# Patient Record
Sex: MALE | Race: WHITE | NOT HISPANIC OR LATINO | Employment: UNEMPLOYED | URBAN - NONMETROPOLITAN AREA
[De-identification: names, ages, dates, MRNs, and addresses within clinical notes are randomized per-mention and may not be internally consistent; named-entity substitution may affect disease eponyms.]

---

## 2017-01-05 ENCOUNTER — HOSPITAL ENCOUNTER (OUTPATIENT)
Dept: URGENT CARE | Facility: CLINIC | Age: 37
Discharge: HOME | End: 2017-01-05
Attending: FAMILY MEDICINE | Admitting: FAMILY MEDICINE

## 2023-03-17 ENCOUNTER — HOSPITAL ENCOUNTER (EMERGENCY)
Facility: HOSPITAL | Age: 43
Discharge: HOME | End: 2023-03-18
Attending: FAMILY MEDICINE | Admitting: FAMILY MEDICINE
Payer: MEDICAID

## 2023-03-17 DIAGNOSIS — S82.001A CLOSED NONDISPLACED FRACTURE OF RIGHT PATELLA, UNSPECIFIED FRACTURE MORPHOLOGY, INITIAL ENCOUNTER: ICD-10-CM

## 2023-03-17 DIAGNOSIS — S82.65XA NONDISPLACED FRACTURE OF LATERAL MALLEOLUS OF LEFT FIBULA, INITIAL ENCOUNTER FOR CLOSED FRACTURE: Primary | ICD-10-CM

## 2023-03-17 DIAGNOSIS — S63.502A SPRAIN OF LEFT WRIST, INITIAL ENCOUNTER: ICD-10-CM
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
## 2023-03-17 PROCEDURE — 99284 EMERGENCY DEPT VISIT MOD MDM: CPT

## 2023-03-17 PROCEDURE — 73110 X-RAY EXAM OF WRIST: CPT

## 2023-03-17 PROCEDURE — 96372 THER/PROPH/DIAG INJ SC/IM: CPT

## 2023-03-18 ENCOUNTER — APPOINTMENT (OUTPATIENT)
Dept: GENERAL RADIOLOGY | Facility: HOSPITAL | Age: 43
End: 2023-03-18
Payer: MEDICAID

## 2023-03-18 VITALS
TEMPERATURE: 98 F | HEART RATE: 96 BPM | OXYGEN SATURATION: 100 % | HEIGHT: 70 IN | SYSTOLIC BLOOD PRESSURE: 114 MMHG | WEIGHT: 135 LBS | DIASTOLIC BLOOD PRESSURE: 80 MMHG | BODY MASS INDEX: 19.33 KG/M2 | RESPIRATION RATE: 16 BRPM

## 2023-03-18 PROCEDURE — 73110 X-RAY EXAM OF WRIST: CPT

## 2023-03-18 PROCEDURE — 73564 X-RAY EXAM KNEE 4 OR MORE: CPT

## 2023-03-18 PROCEDURE — 73090 X-RAY EXAM OF FOREARM: CPT

## 2023-03-18 PROCEDURE — 73610 X-RAY EXAM OF ANKLE: CPT

## 2023-03-18 PROCEDURE — 25010000002 KETOROLAC TROMETHAMINE PER 15 MG: Performed by: FAMILY MEDICINE

## 2023-03-18 PROCEDURE — 73130 X-RAY EXAM OF HAND: CPT

## 2023-03-18 RX ORDER — KETOROLAC TROMETHAMINE 30 MG/ML
30 INJECTION, SOLUTION INTRAMUSCULAR; INTRAVENOUS ONCE
Status: COMPLETED | OUTPATIENT
Start: 2023-03-18 | End: 2023-03-18

## 2023-03-18 RX ORDER — HYDROCODONE BITARTRATE AND ACETAMINOPHEN 5; 325 MG/1; MG/1
1 TABLET ORAL EVERY 6 HOURS PRN
Qty: 15 TABLET | Refills: 0 | Status: SHIPPED | OUTPATIENT
Start: 2023-03-18 | End: 2023-03-23 | Stop reason: SDUPTHER

## 2023-03-18 RX ORDER — HYDROCODONE BITARTRATE AND ACETAMINOPHEN 5; 325 MG/1; MG/1
1 TABLET ORAL ONCE
Status: COMPLETED | OUTPATIENT
Start: 2023-03-18 | End: 2023-03-18

## 2023-03-18 RX ORDER — HYDROCODONE BITARTRATE AND ACETAMINOPHEN 5; 325 MG/1; MG/1
1 TABLET ORAL EVERY 6 HOURS PRN
Qty: 15 TABLET | Refills: 0 | Status: SHIPPED | OUTPATIENT
Start: 2023-03-18 | End: 2023-03-18 | Stop reason: SDUPTHER

## 2023-03-18 RX ADMIN — HYDROCODONE BITARTRATE AND ACETAMINOPHEN 1 TABLET: 5; 325 TABLET ORAL at 02:15

## 2023-03-18 RX ADMIN — KETOROLAC TROMETHAMINE 30 MG: 30 INJECTION, SOLUTION INTRAMUSCULAR; INTRAVENOUS at 02:15

## 2023-03-18 NOTE — ED PROVIDER NOTES
Subjective   History of Present Illness  Patient is a 42 years old who present here today because he fell home while going down the stairs.  Complained having some pain in the left ankle, right knee, left wrist, left forearm and left hand.  Denies any change in mental status.    History provided by:  Patient   used: No        Review of Systems   Musculoskeletal: Positive for arthralgias and joint swelling.   All other systems reviewed and are negative.      History reviewed. No pertinent past medical history.    No Known Allergies    History reviewed. No pertinent surgical history.    History reviewed. No pertinent family history.    Social History     Socioeconomic History   • Marital status: Single   Tobacco Use   • Smoking status: Every Day   • Smokeless tobacco: Never           Objective   Physical Exam  Vitals and nursing note reviewed.   Constitutional:       Appearance: Normal appearance. He is normal weight.   HENT:      Head: Normocephalic and atraumatic.      Right Ear: Tympanic membrane, ear canal and external ear normal.      Left Ear: Tympanic membrane, ear canal and external ear normal.      Nose: Nose normal.   Eyes:      Extraocular Movements: Extraocular movements intact.      Conjunctiva/sclera: Conjunctivae normal.      Pupils: Pupils are equal, round, and reactive to light.   Cardiovascular:      Rate and Rhythm: Normal rate and regular rhythm.      Pulses: Normal pulses.      Heart sounds: Normal heart sounds.   Pulmonary:      Effort: Pulmonary effort is normal.      Breath sounds: Normal breath sounds.   Abdominal:      General: Abdomen is flat. Bowel sounds are normal.      Palpations: Abdomen is soft.   Musculoskeletal:         General: Swelling, tenderness and signs of injury present. Normal range of motion.      Cervical back: Normal range of motion and neck supple.      Right knee: Swelling and bony tenderness present. Tenderness present.        Legs:       Comments:  Swelling and tender   Skin:     Capillary Refill: Capillary refill takes less than 2 seconds.      Findings: Bruising present.   Neurological:      General: No focal deficit present.      Mental Status: He is alert and oriented to person, place, and time.         Procedures           ED Course            Labs Reviewed - No data to display    XR Knee 4+ View Right   Final Result   1.  Fracture of the left medial malleolus and possibly also the   posterior malleolus.   2.  Fracture of the right patella with a moderate right knee   effusion      Electronically signed by:  Raghu Villagran MD  3/18/2023 2:41 AM CDT   Workstation: [id number]      XR Wrist 3+ View Right   Final Result   1.  Fracture of the left medial malleolus and possibly also the   posterior malleolus.   2.  Fracture of the right patella with a moderate right knee   effusion      Electronically signed by:  Raghu Villagran MD  3/18/2023 2:41 AM CDT   Workstation: [id number]      XR Ankle 3+ View Left   Final Result   1.  Fracture of the left medial malleolus and possibly also the   posterior malleolus.   2.  Fracture of the right patella with a moderate right knee   effusion      Electronically signed by:  Raghu Villagran MD  3/18/2023 2:41 AM CDT   Workstation: [id number]      XR Forearm 2 View Left   Final Result   1.  Fracture of the left medial malleolus and possibly also the   posterior malleolus.   2.  Fracture of the right patella with a moderate right knee   effusion      Electronically signed by:  Raghu Villagran MD  3/18/2023 2:41 AM CDT   Workstation: [id number]      XR Wrist 3+ View Left   Final Result   1.  Fracture of the left medial malleolus and possibly also the   posterior malleolus.   2.  Fracture of the right patella with a moderate right knee   effusion      Electronically signed by:  Raghu Villagran MD  3/18/2023 2:41 AM CDT   Workstation: [id number]      XR Hand 3+ View Left   Final Result   1.  Fracture of the left medial malleolus and possibly also the   posterior  malleolus.   2.  Fracture of the right patella with a moderate right knee   effusion      Electronically signed by:  Raghu Villagran MD  3/18/2023 2:41 AM CDT   Workstation: [id number]                                        Medical Decision Making  Patient is a 42 years old who fell from the stairs have a right patella fracture, left distal fibula fracture.  Patient was discharged home to follow-up with .  Patient was put on the boot and for the ankle and then right knee splint.  Patient was given crutches.    Closed nondisplaced fracture of right patella, unspecified fracture morphology, initial encounter: acute illness or injury  Fall, initial encounter: acute illness or injury  Nondisplaced fracture of lateral malleolus of left fibula, initial encounter for closed fracture: acute illness or injury  Sprain of left wrist, initial encounter: acute illness or injury  Amount and/or Complexity of Data Reviewed  Radiology: ordered.      Risk  Prescription drug management.          Final diagnoses:   Nondisplaced fracture of lateral malleolus of left fibula, initial encounter for closed fracture   Fall, initial encounter   Sprain of left wrist, initial encounter   Closed nondisplaced fracture of right patella, unspecified fracture morphology, initial encounter       ED Disposition  ED Disposition     ED Disposition   Discharge    Condition   Stable    Comment   --             No follow-up provider specified.       Medication List      No changes were made to your prescriptions during this visit.          Kris Rosales MD  03/18/23 0304       Kris Rosales MD  03/18/23 0309

## 2023-03-18 NOTE — DISCHARGE INSTRUCTIONS
Patient was told not to apply weight to the ankle and also to the knee as much as possible.  Take pain medication as directed.  Follow-up with Dr. Kang in 2 days.  Come back to the ER symptoms get worse.

## 2023-03-18 NOTE — ED NOTES
"Patient called the  repeatedly demanding pain medication. States \"I'm not doing anything until someone comes in here and gives me some damn pain meds!\" This RN explained to patient that I could not administer medication without a doctors order and that he was next to be picked up. This RN apologized about the delay.   "

## 2023-03-22 DIAGNOSIS — M25.572 ACUTE LEFT ANKLE PAIN: ICD-10-CM

## 2023-03-22 DIAGNOSIS — M25.561 ACUTE PAIN OF RIGHT KNEE: Primary | ICD-10-CM

## 2023-03-23 ENCOUNTER — OFFICE VISIT (OUTPATIENT)
Dept: ORTHOPEDIC SURGERY | Facility: CLINIC | Age: 43
End: 2023-03-23
Payer: MEDICAID

## 2023-03-23 VITALS — BODY MASS INDEX: 19.33 KG/M2 | HEIGHT: 70 IN | WEIGHT: 135 LBS

## 2023-03-23 DIAGNOSIS — M25.572 ACUTE LEFT ANKLE PAIN: Primary | ICD-10-CM

## 2023-03-23 DIAGNOSIS — S82.65XA NONDISPLACED FRACTURE OF LATERAL MALLEOLUS OF LEFT FIBULA, INITIAL ENCOUNTER FOR CLOSED FRACTURE: ICD-10-CM
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
## 2023-03-23 DIAGNOSIS — S63.502A SPRAIN OF LEFT WRIST, INITIAL ENCOUNTER: ICD-10-CM

## 2023-03-23 DIAGNOSIS — S82.001A CLOSED NONDISPLACED FRACTURE OF RIGHT PATELLA, UNSPECIFIED FRACTURE MORPHOLOGY, INITIAL ENCOUNTER: ICD-10-CM

## 2023-03-23 DIAGNOSIS — M25.561 ACUTE PAIN OF RIGHT KNEE: ICD-10-CM

## 2023-03-23 PROCEDURE — 27786 TREATMENT OF ANKLE FRACTURE: CPT | Performed by: NURSE PRACTITIONER

## 2023-03-23 PROCEDURE — 99204 OFFICE O/P NEW MOD 45 MIN: CPT | Performed by: NURSE PRACTITIONER

## 2023-03-23 PROCEDURE — 27520 TREAT KNEECAP FRACTURE: CPT | Performed by: NURSE PRACTITIONER

## 2023-03-23 RX ORDER — HYDROCODONE BITARTRATE AND ACETAMINOPHEN 5; 325 MG/1; MG/1
1 TABLET ORAL EVERY 6 HOURS PRN
Qty: 20 TABLET | Refills: 0 | Status: SHIPPED | OUTPATIENT
Start: 2023-03-23 | End: 2023-03-24 | Stop reason: SDUPTHER

## 2023-03-23 NOTE — PROGRESS NOTES
Walter Zhang is a 42 y.o. male   Primary provider:  Provider, No Known       Chief Complaint   Patient presents with   • Right Knee - Pain   • Left Ankle - Pain       HISTORY OF PRESENT ILLNESS: This 42-year-old male patient presents today with complaints of right knee and left ankle pain.  This patient reports he fell down several stairs while carrying a dresser which fell on top of him.  The patient reports this injury occurred on 3/18/2023.  The patient was seen in the ER on 3/18/2023, he was provided a knee immobilizer for the right knee and a tall walking boot for the left lower extremity.  Patient is not wearing the knee immobilizer correctly and his knee is bent at approximately 85 degrees.  The patient reports his left ankle pain is severe, constant with crushing aching pain.  He also reports swelling, bruising and redness to the ankle.  The patient reports his pain is worse with sitting, standing, driving and walking.  Denies numbness and tingling.  Denies fever and chills.  Sent for x-ray of the left ankle upon arrival.    Additional complaints include right knee pain.  The patient was diagnosed with a right patella fracture in the ER on 3/18/2023.  The patient is wearing a knee immobilizer incorrectly at today's visit.  Patient reports his pain in his knee is moderate to severe with constant crushing, aching pain.  Additional symptoms include swelling and bruising.  Patient reports his pain is worse with standing, sitting, driving and walking.  The patient was prescribed hydrocodone at his ER visit.  The patient reports this has helped his pain.  The patient has been wearing the knee immobilizer and the walking boot.  The adult female with the patient reports the patient has been resting mostly and not walking on either leg.  Denies numbness and tingling.  Denies fever and chills.  Sent for a right knee x-ray upon arrival.    Pain  This is a new (going down stairs and fell. ) problem. Episode  "onset: 3/18/2023. The problem occurs intermittently. The problem has been unchanged. Associated symptoms comments: Crushing, redness, bruising, swelling. . The symptoms are aggravated by walking and standing (sitting, driving. ).        CONCURRENT MEDICAL HISTORY:    No past medical history on file.    No Known Allergies      Current Outpatient Medications:   •  albuterol (PROVENTIL HFA;VENTOLIN HFA) 108 (90 BASE) MCG/ACT inhaler, Inhale 2 puffs Every 4 (Four) Hours As Needed for Wheezing., Disp: , Rfl:   •  HYDROcodone-acetaminophen (NORCO) 5-325 MG per tablet, Take 1 tablet by mouth 2 (Two) Times a Day As Needed for Moderate Pain., Disp: 30 tablet, Rfl: 0    No past surgical history on file.    No family history on file.    Social History     Socioeconomic History   • Marital status: Single   Tobacco Use   • Smoking status: Every Day   • Smokeless tobacco: Never        Review of Systems   Constitutional: Negative.    HENT: Negative.    Eyes: Negative.    Respiratory: Negative.    Cardiovascular: Negative.    Gastrointestinal: Negative.    Endocrine: Negative.    Genitourinary: Negative.    Musculoskeletal: Negative.    Skin: Negative.    Allergic/Immunologic: Negative.    Neurological: Negative.    Hematological: Negative.    Psychiatric/Behavioral: Negative.        PHYSICAL EXAMINATION:       Ht 177.8 cm (70\")   Wt 61.2 kg (135 lb)   BMI 19.37 kg/m²     Physical Exam  Vitals and nursing note reviewed.   Pulmonary:      Effort: Pulmonary effort is normal.   Neurological:      Mental Status: He is alert and oriented to person, place, and time.   Psychiatric:         Mood and Affect: Mood normal.         GAIT:     []  Normal  [x]  Antalgic    Assistive device: []  None  []  Walker     []  Crutches  []  Cane     [x]  Wheelchair  []  Stretcher    Left Ankle Exam     Tenderness   Left ankle tenderness location: Diffuse.   Swelling: moderate    Other   Erythema: absent  Sensation: normal  Pulse: present    Comments:  " Deferred range of motion and strength due to known injury and transverse fracture to the distal fibula.  Ecchymosis noted.  Able to wiggle toes.  Pedal pulse palpable.        Right Knee Exam     Tenderness   Right knee tenderness location: Tender to palpation over the patella.    Range of Motion   Extension: abnormal   Flexion: abnormal     Other   Swelling: mild    Comments:  Deferred range of motion due to known patella fracture.  No signs of open area or fracture noted.  Distal pulse palpable.                    XR Forearm 2 View Left    Result Date: 3/18/2023  Narrative: EXAM DESCRIPTION: XR WRIST 3+ VW LEFT (accession [id number]), XR KNEE 4+ VW RIGHT (accession [id number]), XR WRIST 3+ VW RIGHT (accession [id number]), XR FOREARM 2 VW LEFT (accession [id number]), XR ANKLE 3+ VW LEFT (accession [id number]), XR HAND 3+ VW LEFT (accession [id number]) RadLex: XR WRIST 3 OR MORE VIEWS, XR KNEE 4 OR MORE VIEWS, XR WRIST 3 OR MORE VIEWS, XR FOREARM 2 VIEWS, XR ANKLE 3 OR MORE VIEWS, XR HAND 3 OR MORE VIEWS CLINICAL HISTORY: 42 years  Male;  injury FINDINGS: Left hand: 3 views. No acute fracture or dislocation. Left wrist: 4 views. No acute fracture or dislocation. Left forearm: 2 views. No acute fracture. Left ankle: Fracture of the medial malleolus. Possible fracture in the posterior malleolus also. Right knee: 4 views. Transverse fracture of the patella. Moderate knee effusion. Right wrist: 3 views. No acute fracture or dislocation.     Impression: 1.  Fracture of the left medial malleolus and possibly also the posterior malleolus. 2.  Fracture of the right patella with a moderate right knee effusion Electronically signed by:  Raghu Villagran MD  3/18/2023 2:41 AM CDT Workstation: [id number]    XR Wrist 3+ View Left    Result Date: 3/18/2023  Narrative: EXAM DESCRIPTION: XR WRIST 3+ VW LEFT (accession [id number]), XR KNEE 4+ VW RIGHT (accession [id number]), XR WRIST 3+ VW RIGHT (accession [id number]), XR FOREARM 2  VW LEFT (accession [id number]), XR ANKLE 3+ VW LEFT (accession [id number]), XR HAND 3+ VW LEFT (accession [id number]) RadLex: XR WRIST 3 OR MORE VIEWS, XR KNEE 4 OR MORE VIEWS, XR WRIST 3 OR MORE VIEWS, XR FOREARM 2 VIEWS, XR ANKLE 3 OR MORE VIEWS, XR HAND 3 OR MORE VIEWS CLINICAL HISTORY: 42 years  Male;  injury FINDINGS: Left hand: 3 views. No acute fracture or dislocation. Left wrist: 4 views. No acute fracture or dislocation. Left forearm: 2 views. No acute fracture. Left ankle: Fracture of the medial malleolus. Possible fracture in the posterior malleolus also. Right knee: 4 views. Transverse fracture of the patella. Moderate knee effusion. Right wrist: 3 views. No acute fracture or dislocation.     Impression: 1.  Fracture of the left medial malleolus and possibly also the posterior malleolus. 2.  Fracture of the right patella with a moderate right knee effusion Electronically signed by:  Raghu Villagran MD  3/18/2023 2:41 AM CDT Workstation: [id number]    XR Wrist 3+ View Right    Result Date: 3/18/2023  Narrative: EXAM DESCRIPTION: XR WRIST 3+ VW LEFT (accession [id number]), XR KNEE 4+ VW RIGHT (accession [id number]), XR WRIST 3+ VW RIGHT (accession [id number]), XR FOREARM 2 VW LEFT (accession [id number]), XR ANKLE 3+ VW LEFT (accession [id number]), XR HAND 3+ VW LEFT (accession [id number]) RadLex: XR WRIST 3 OR MORE VIEWS, XR KNEE 4 OR MORE VIEWS, XR WRIST 3 OR MORE VIEWS, XR FOREARM 2 VIEWS, XR ANKLE 3 OR MORE VIEWS, XR HAND 3 OR MORE VIEWS CLINICAL HISTORY: 42 years  Male;  injury FINDINGS: Left hand: 3 views. No acute fracture or dislocation. Left wrist: 4 views. No acute fracture or dislocation. Left forearm: 2 views. No acute fracture. Left ankle: Fracture of the medial malleolus. Possible fracture in the posterior malleolus also. Right knee: 4 views. Transverse fracture of the patella. Moderate knee effusion. Right wrist: 3 views. No acute fracture or dislocation.     Impression: 1.  Fracture of the  left medial malleolus and possibly also the posterior malleolus. 2.  Fracture of the right patella with a moderate right knee effusion Electronically signed by:  Raghu Villagran MD  3/18/2023 2:41 AM CDT Workstation: [id number]    XR Hand 3+ View Left    Result Date: 3/18/2023  Narrative: EXAM DESCRIPTION: XR WRIST 3+ VW LEFT (accession [id number]), XR KNEE 4+ VW RIGHT (accession [id number]), XR WRIST 3+ VW RIGHT (accession [id number]), XR FOREARM 2 VW LEFT (accession [id number]), XR ANKLE 3+ VW LEFT (accession [id number]), XR HAND 3+ VW LEFT (accession [id number]) RadLex: XR WRIST 3 OR MORE VIEWS, XR KNEE 4 OR MORE VIEWS, XR WRIST 3 OR MORE VIEWS, XR FOREARM 2 VIEWS, XR ANKLE 3 OR MORE VIEWS, XR HAND 3 OR MORE VIEWS CLINICAL HISTORY: 42 years  Male;  injury FINDINGS: Left hand: 3 views. No acute fracture or dislocation. Left wrist: 4 views. No acute fracture or dislocation. Left forearm: 2 views. No acute fracture. Left ankle: Fracture of the medial malleolus. Possible fracture in the posterior malleolus also. Right knee: 4 views. Transverse fracture of the patella. Moderate knee effusion. Right wrist: 3 views. No acute fracture or dislocation.     Impression: 1.  Fracture of the left medial malleolus and possibly also the posterior malleolus. 2.  Fracture of the right patella with a moderate right knee effusion Electronically signed by:  Raghu Villagran MD  3/18/2023 2:41 AM CDT Workstation: [id number]    XR Knee 1 or 2 View Right    Result Date: 4/1/2023  Narrative: EXAM: Right knee series, 2 views CLINICAL INDICATION: Patellar fracture COMPARISON: Prior knee radiographs 3/23/2023 FINDINGS: There are patellar body lucencies redemonstrated and grossly unchanged from prior exam. No evidence of increased sclerosis. There is persistent large suprapatellar effusion, mild prepatellar edema and Hoffa's fat pad edema noted. The remaining bones and soft tissues appear normal.     Impression: CONCLUSION: Stable appearing patellar  fracture as above. Electronically signed by:  Santy Jean DO  4/1/2023 10:09 AM CDT Workstation: [id number]    XR Knee 1 or 2 View Right    Result Date: 3/25/2023  Narrative: PROCEDURE: XR KNEE 1 OR 2 VW RIGHT VIEWS:   2 INDICATION: Pain COMPARISON: 3/18/2023 FINDINGS:   - fracture: Transversely oriented, mildly comminuted patellar fracture   - alignment: Unremarkable and unchanged   - misc: Interval decrease in suprapatellar effusion     Impression: Transversely oriented patellar fracture without evidence of interval osseous healing or interval change in alignment. Decreased suprapatellar effusion. Electronically signed by:  Barbara Palmer MD  3/25/2023 1:27 PM CDT Workstation: [id number]    XR Knee 4+ View Right    Result Date: 3/18/2023  Narrative: EXAM DESCRIPTION: XR WRIST 3+ VW LEFT (accession [id number]), XR KNEE 4+ VW RIGHT (accession [id number]), XR WRIST 3+ VW RIGHT (accession [id number]), XR FOREARM 2 VW LEFT (accession [id number]), XR ANKLE 3+ VW LEFT (accession [id number]), XR HAND 3+ VW LEFT (accession [id number]) RadLex: XR WRIST 3 OR MORE VIEWS, XR KNEE 4 OR MORE VIEWS, XR WRIST 3 OR MORE VIEWS, XR FOREARM 2 VIEWS, XR ANKLE 3 OR MORE VIEWS, XR HAND 3 OR MORE VIEWS CLINICAL HISTORY: 42 years  Male;  injury FINDINGS: Left hand: 3 views. No acute fracture or dislocation. Left wrist: 4 views. No acute fracture or dislocation. Left forearm: 2 views. No acute fracture. Left ankle: Fracture of the medial malleolus. Possible fracture in the posterior malleolus also. Right knee: 4 views. Transverse fracture of the patella. Moderate knee effusion. Right wrist: 3 views. No acute fracture or dislocation.     Impression: 1.  Fracture of the left medial malleolus and possibly also the posterior malleolus. 2.  Fracture of the right patella with a moderate right knee effusion Electronically signed by:  Raghu Villagran MD  3/18/2023 2:41 AM CDT Workstation: [id number]    XR Ankle 3+ View Left    Result Date:  4/1/2023  Narrative: PROCEDURE: XR ANKLE 3+ VW LEFT VIEWS: 4 INDICATION: Fracture COMPARISON: 3/23/2023 FINDINGS:   -Mildly comminuted, minimally displaced fracture of the distal fibula at the level of the tibiotalar joint. No interval change in alignment. No significant callus formation at the fracture line is evident. Mild soft tissue swelling overlies the fracture     Impression: Unchanged appearance of mildly comminuted fracture of the distal fibula as above. Electronically signed by:  Barbara Palmer MD  4/1/2023 3:02 PM CDT Workstation: [id number]    XR Ankle 3+ View Left    Result Date: 3/25/2023  Narrative: PROCEDURE: XR ANKLE 3+ VW LEFT VIEWS: 3 INDICATION: Pain COMPARISON: 3/18/2023 FINDINGS:   - fracture: Mild irregularity of the lateral malleolus consistent with nondisplaced fracture, as seen previously. - alignment: Normal displacement at fracture site   - misc: Small joint effusion. Mild soft tissue swelling lateral greater than medial     Impression: Transversely oriented, minimally displaced fracture of the lateral malleolus at the level of the joint line. Soft tissue swelling and small suspected effusion.. Electronically signed by:  Barbara Palmer MD  3/25/2023 3:39 PM CDT Workstation: [id number]    XR Ankle 3+ View Left    Result Date: 3/18/2023  Narrative: EXAM DESCRIPTION: XR WRIST 3+ VW LEFT (accession [id number]), XR KNEE 4+ VW RIGHT (accession [id number]), XR WRIST 3+ VW RIGHT (accession [id number]), XR FOREARM 2 VW LEFT (accession [id number]), XR ANKLE 3+ VW LEFT (accession [id number]), XR HAND 3+ VW LEFT (accession [id number]) RadLex: XR WRIST 3 OR MORE VIEWS, XR KNEE 4 OR MORE VIEWS, XR WRIST 3 OR MORE VIEWS, XR FOREARM 2 VIEWS, XR ANKLE 3 OR MORE VIEWS, XR HAND 3 OR MORE VIEWS CLINICAL HISTORY: 42 years  Male;  injury FINDINGS: Left hand: 3 views. No acute fracture or dislocation. Left wrist: 4 views. No acute fracture or dislocation. Left forearm: 2 views. No acute fracture. Left  ankle: Fracture of the medial malleolus. Possible fracture in the posterior malleolus also. Right knee: 4 views. Transverse fracture of the patella. Moderate knee effusion. Right wrist: 3 views. No acute fracture or dislocation.     Impression: 1.  Fracture of the left medial malleolus and possibly also the posterior malleolus. 2.  Fracture of the right patella with a moderate right knee effusion Electronically signed by:  Raghu Villagran MD  3/18/2023 2:41 AM CDT Workstation: [id number]        ASSESSMENT:    Diagnoses and all orders for this visit:    Acute left ankle pain  -     Commode Chair  -     Miscellaneous DME  -     Standard Wheelchair  -     Discontinue: HYDROcodone-acetaminophen (NORCO) 5-325 MG per tablet; Take 1 tablet by mouth Every 6 (Six) Hours As Needed for Moderate Pain.    Acute pain of right knee  -     Commode Chair  -     Miscellaneous DME  -     Standard Wheelchair  -     Discontinue: HYDROcodone-acetaminophen (NORCO) 5-325 MG per tablet; Take 1 tablet by mouth Every 6 (Six) Hours As Needed for Moderate Pain.    Closed nondisplaced fracture of right patella, unspecified fracture morphology, initial encounter  -     Commode Chair  -     Miscellaneous DME  -     Standard Wheelchair  -     Discontinue: HYDROcodone-acetaminophen (NORCO) 5-325 MG per tablet; Take 1 tablet by mouth Every 6 (Six) Hours As Needed for Moderate Pain.    Nondisplaced fracture of lateral malleolus of left fibula, initial encounter for closed fracture  -     Commode Chair  -     Miscellaneous DME  -     Standard Wheelchair  -     Discontinue: HYDROcodone-acetaminophen (NORCO) 5-325 MG per tablet; Take 1 tablet by mouth Every 6 (Six) Hours As Needed for Moderate Pain.    Sprain of left wrist, initial encounter  -     Discontinue: HYDROcodone-acetaminophen (NORCO) 5-325 MG per tablet; Take 1 tablet by mouth Every 6 (Six) Hours As Needed for Moderate Pain.    Fall (on) (from) other stairs and steps, initial  encounter          PLAN  For x-ray of the right knee and the left ankle upon arrival.  Reviewed the x-rays and discussed with the patient.  Prior to discussing with the patient, reviewed the x-rays with Dr. Christian.  Dr. Christian recommends the patient continue with conservative measures including a long-leg knee immobilizer with the right lower extremity.  Educated the patient he should not be bending his knee and should keep the knee straight, elevated and ice frequently throughout the day.  As for his left ankle, the ankle mortise is maintained and Dr. Christian recommends a tall walking boot.  Advised the patient to modify his activity as best as possible.  And if necessary, to bear more weight on the left lower extremity than the right.  The knee immobilizer was fitted properly and the patient was educated proper placement of the knee immobilizer and how to don and doff the knee immobilizer.  Educated the patient of his suggestion.  Patient verbalized understanding.  Patient provided a DME for a shower chair, bedside toilet and wheelchair.  These are needed as this patient is unable to ambulate.  Recommend the patient to stand and pivot gently and avoid any flexion with the right knee.  Patient verbalized understanding.  Encouraged to rest, ice and elevate both extremities.  New prescription for hydrocodone sent to the pharmacy.  Advised the patient this prescription should be taken sparingly.  Suggested the patient break in half and take with a 500 mg Tylenol.  Also discussed with the patient to alternate ibuprofen and acetaminophen.  Patient verbalized understanding.  Recommended the patient follow-up in 1 week for repeat x-ray of the right knee and left ankle.  Educated the patient he may follow-up sooner as needed if symptoms change or worsen or for new complaint.    All questions and concerns are addressed with understanding noted. They are aware and are in agreement to this plan.    Return in about 1 week (around  3/30/2023) for Recheck with right knee and left ankle xray.    EMILEE Montenegro     This document has been electronically signed by EMILEE Montenegro on April 7, 2023 12:19 CDT      EMR Dragon/Transciption Disclaimer: Some of this note may be an electronic transcription/translation of spoken language to printed text using the Dragon Dictation System.     Time spent of a minimum of 45 minutes including the face to face evaluation, reviewing of medical history and prior medial records, reviewing of diagnostic studies, ordering additional tests, documentation, patient education and coordination of care.

## 2023-03-24 DIAGNOSIS — M25.572 ACUTE LEFT ANKLE PAIN: ICD-10-CM

## 2023-03-24 DIAGNOSIS — M25.561 ACUTE PAIN OF RIGHT KNEE: ICD-10-CM

## 2023-03-24 DIAGNOSIS — S82.65XA NONDISPLACED FRACTURE OF LATERAL MALLEOLUS OF LEFT FIBULA, INITIAL ENCOUNTER FOR CLOSED FRACTURE: ICD-10-CM

## 2023-03-24 DIAGNOSIS — S63.502A SPRAIN OF LEFT WRIST, INITIAL ENCOUNTER: ICD-10-CM

## 2023-03-24 DIAGNOSIS — S82.001A CLOSED NONDISPLACED FRACTURE OF RIGHT PATELLA, UNSPECIFIED FRACTURE MORPHOLOGY, INITIAL ENCOUNTER: ICD-10-CM

## 2023-03-24 RX ORDER — HYDROCODONE BITARTRATE AND ACETAMINOPHEN 5; 325 MG/1; MG/1
1 TABLET ORAL EVERY 6 HOURS PRN
Qty: 20 TABLET | Refills: 0 | Status: SHIPPED | OUTPATIENT
Start: 2023-03-24 | End: 2023-03-31 | Stop reason: SDUPTHER

## 2023-03-24 NOTE — TELEPHONE ENCOUNTER
Shawna    Could you call in patient's pain medicine to [facility].  [facility] is out of it.  Please let his dad know.

## 2023-03-29 DIAGNOSIS — M25.572 ACUTE LEFT ANKLE PAIN: Primary | ICD-10-CM

## 2023-03-31 ENCOUNTER — OFFICE VISIT (OUTPATIENT)
Dept: ORTHOPEDIC SURGERY | Facility: CLINIC | Age: 43
End: 2023-03-31
Payer: MEDICAID

## 2023-03-31 VITALS — WEIGHT: 135 LBS | BODY MASS INDEX: 19.33 KG/M2 | HEIGHT: 70 IN
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
## 2023-03-31 DIAGNOSIS — S82.001D CLOSED NONDISPLACED FRACTURE OF RIGHT PATELLA WITH ROUTINE HEALING, UNSPECIFIED FRACTURE MORPHOLOGY, SUBSEQUENT ENCOUNTER: Primary | ICD-10-CM

## 2023-03-31 DIAGNOSIS — S82.65XD NONDISPLACED FRACTURE OF LATERAL MALLEOLUS OF LEFT FIBULA, SUBSEQUENT ENCOUNTER FOR CLOSED FRACTURE WITH ROUTINE HEALING: ICD-10-CM

## 2023-03-31 DIAGNOSIS — M25.561 ACUTE PAIN OF RIGHT KNEE: ICD-10-CM

## 2023-03-31 DIAGNOSIS — S63.502A SPRAIN OF LEFT WRIST, INITIAL ENCOUNTER: ICD-10-CM

## 2023-03-31 DIAGNOSIS — M25.572 ACUTE LEFT ANKLE PAIN: ICD-10-CM

## 2023-03-31 RX ORDER — HYDROCODONE BITARTRATE AND ACETAMINOPHEN 5; 325 MG/1; MG/1
1 TABLET ORAL 2 TIMES DAILY PRN
Qty: 30 TABLET | Refills: 0 | Status: SHIPPED | OUTPATIENT
Start: 2023-03-31

## 2023-03-31 NOTE — PROGRESS NOTES
"Walter Zhang is a 42 y.o. male returns for     Chief Complaint   Patient presents with   • Right Knee - Follow-up   • Left Ankle - Follow-up       HISTORY OF PRESENT ILLNESS: This 42-year-old male patient is here for 1 week follow-up.  Patient had an injury on 3/18/2023.  The patient reports he fell down several stairs while carrying a dresser and the dresser fell on top of him.  Patient initially followed up with the ER on his date of injury, 3/18/2023, patient was provided a right knee immobilizer and a tall walking boot for his left lower extremity.  The patient saw me last week on 3/23/2023.  Repeat x-rays were performed, recommended to continue wearing the ankle brace, the knee immobilizer, new prescription for hydrocodone sent, educated to take sparingly, educated to modify weightbearing activity and rest as much as possible.  He is here today for repeat x-ray of the left ankle and right knee.  Patient has no unusual complaints at today's visit.  Patient states his right knee feels much better than his ankle.  Patient reports he still has some swelling and pain of the left ankle.  Patient states it is more painful with than his knee to attempt to walk or transfer with.  Patient is using crutches and is wearing his knee immobilizer on the right leg and his tall walking boot on the left leg at today's visit.  Denies numbness and tingling.  Denies fever and chills.  Denies new fall or injury.  Sent for x-ray upon arrival.     CONCURRENT MEDICAL HISTORY:    The following portions of the patient's history were reviewed and updated as appropriate: allergies, current medications, past family history, past medical history, past social history, past surgical history and problem list.     ROS  No fevers or chills.  No chest pain or shortness of air.  No GI or  disturbances.    PHYSICAL EXAMINATION:       Ht 177.8 cm (70\")   Wt 61.2 kg (135 lb)   BMI 19.37 kg/m²     Physical Exam    GAIT:     []  Normal  []  " Antalgic    Assistive device: []  None  []  Walker     []  Crutches  []  Cane     []  Wheelchair  []  Stretcher    Ortho Exam      XR Forearm 2 View Left    Result Date: 3/18/2023  Narrative: EXAM DESCRIPTION: XR WRIST 3+ VW LEFT (accession [id number]), XR KNEE 4+ VW RIGHT (accession [id number]), XR WRIST 3+ VW RIGHT (accession [id number]), XR FOREARM 2 VW LEFT (accession [id number]), XR ANKLE 3+ VW LEFT (accession [id number]), XR HAND 3+ VW LEFT (accession [id number]) RadLex: XR WRIST 3 OR MORE VIEWS, XR KNEE 4 OR MORE VIEWS, XR WRIST 3 OR MORE VIEWS, XR FOREARM 2 VIEWS, XR ANKLE 3 OR MORE VIEWS, XR HAND 3 OR MORE VIEWS CLINICAL HISTORY: 42 years  Male;  injury FINDINGS: Left hand: 3 views. No acute fracture or dislocation. Left wrist: 4 views. No acute fracture or dislocation. Left forearm: 2 views. No acute fracture. Left ankle: Fracture of the medial malleolus. Possible fracture in the posterior malleolus also. Right knee: 4 views. Transverse fracture of the patella. Moderate knee effusion. Right wrist: 3 views. No acute fracture or dislocation.     Impression: 1.  Fracture of the left medial malleolus and possibly also the posterior malleolus. 2.  Fracture of the right patella with a moderate right knee effusion Electronically signed by:  Raghu Villagran MD  3/18/2023 2:41 AM CDT Workstation: [id number]    XR Wrist 3+ View Left    Result Date: 3/18/2023  Narrative: EXAM DESCRIPTION: XR WRIST 3+ VW LEFT (accession [id number]), XR KNEE 4+ VW RIGHT (accession [id number]), XR WRIST 3+ VW RIGHT (accession [id number]), XR FOREARM 2 VW LEFT (accession [id number]), XR ANKLE 3+ VW LEFT (accession [id number]), XR HAND 3+ VW LEFT (accession [id number]) RadLex: XR WRIST 3 OR MORE VIEWS, XR KNEE 4 OR MORE VIEWS, XR WRIST 3 OR MORE VIEWS, XR FOREARM 2 VIEWS, XR ANKLE 3 OR MORE VIEWS, XR HAND 3 OR MORE VIEWS CLINICAL HISTORY: 42 years  Male;  injury FINDINGS: Left hand: 3 views. No acute fracture or dislocation. Left  wrist: 4 views. No acute fracture or dislocation. Left forearm: 2 views. No acute fracture. Left ankle: Fracture of the medial malleolus. Possible fracture in the posterior malleolus also. Right knee: 4 views. Transverse fracture of the patella. Moderate knee effusion. Right wrist: 3 views. No acute fracture or dislocation.     Impression: 1.  Fracture of the left medial malleolus and possibly also the posterior malleolus. 2.  Fracture of the right patella with a moderate right knee effusion Electronically signed by:  Raghu Villagran MD  3/18/2023 2:41 AM CDT Workstation: [id number]    XR Wrist 3+ View Right    Result Date: 3/18/2023  Narrative: EXAM DESCRIPTION: XR WRIST 3+ VW LEFT (accession [id number]), XR KNEE 4+ VW RIGHT (accession [id number]), XR WRIST 3+ VW RIGHT (accession [id number]), XR FOREARM 2 VW LEFT (accession [id number]), XR ANKLE 3+ VW LEFT (accession [id number]), XR HAND 3+ VW LEFT (accession [id number]) RadLex: XR WRIST 3 OR MORE VIEWS, XR KNEE 4 OR MORE VIEWS, XR WRIST 3 OR MORE VIEWS, XR FOREARM 2 VIEWS, XR ANKLE 3 OR MORE VIEWS, XR HAND 3 OR MORE VIEWS CLINICAL HISTORY: 42 years  Male;  injury FINDINGS: Left hand: 3 views. No acute fracture or dislocation. Left wrist: 4 views. No acute fracture or dislocation. Left forearm: 2 views. No acute fracture. Left ankle: Fracture of the medial malleolus. Possible fracture in the posterior malleolus also. Right knee: 4 views. Transverse fracture of the patella. Moderate knee effusion. Right wrist: 3 views. No acute fracture or dislocation.     Impression: 1.  Fracture of the left medial malleolus and possibly also the posterior malleolus. 2.  Fracture of the right patella with a moderate right knee effusion Electronically signed by:  Raghu Villagran MD  3/18/2023 2:41 AM [employer] Workstation: [id number]    XR Hand 3+ View Left    Result Date: 3/18/2023  Narrative: EXAM DESCRIPTION: XR WRIST 3+ VW LEFT (accession [id number]), XR KNEE 4+ VW RIGHT (accession  [id number]), XR WRIST 3+ VW RIGHT (accession [id number]), XR FOREARM 2 VW LEFT (accession [id number]), XR ANKLE 3+ VW LEFT (accession [id number]), XR HAND 3+ VW LEFT (accession [id number]) RadLex: XR WRIST 3 OR MORE VIEWS, XR KNEE 4 OR MORE VIEWS, XR WRIST 3 OR MORE VIEWS, XR FOREARM 2 VIEWS, XR ANKLE 3 OR MORE VIEWS, XR HAND 3 OR MORE VIEWS CLINICAL HISTORY: 42 years  Male;  injury FINDINGS: Left hand: 3 views. No acute fracture or dislocation. Left wrist: 4 views. No acute fracture or dislocation. Left forearm: 2 views. No acute fracture. Left ankle: Fracture of the medial malleolus. Possible fracture in the posterior malleolus also. Right knee: 4 views. Transverse fracture of the patella. Moderate knee effusion. Right wrist: 3 views. No acute fracture or dislocation.     Impression: 1.  Fracture of the left medial malleolus and possibly also the posterior malleolus. 2.  Fracture of the right patella with a moderate right knee effusion Electronically signed by:  Raghu Villagran MD  3/18/2023 2:41 AM CDT Workstation: [id number]    XR Knee 1 or 2 View Right    Result Date: 3/25/2023  Narrative: PROCEDURE: XR KNEE 1 OR 2 VW RIGHT VIEWS:   2 INDICATION: Pain COMPARISON: 3/18/2023 FINDINGS:   - fracture: Transversely oriented, mildly comminuted patellar fracture   - alignment: Unremarkable and unchanged   - misc: Interval decrease in suprapatellar effusion     Impression: Transversely oriented patellar fracture without evidence of interval osseous healing or interval change in alignment. Decreased suprapatellar effusion. Electronically signed by:  Barbara Palmer MD  3/25/2023 1:27 PM CDT Workstation: [id number]    XR Knee 4+ View Right    Result Date: 3/18/2023  Narrative: EXAM DESCRIPTION: XR WRIST 3+ VW LEFT (accession [id number]), XR KNEE 4+ VW RIGHT (accession [id number]), XR WRIST 3+ VW RIGHT (accession [id number]), XR FOREARM 2 VW LEFT (accession [id number]), XR ANKLE 3+ VW LEFT (accession [id number]), XR HAND  3+ VW LEFT (accession [id number]) RadLex: XR WRIST 3 OR MORE VIEWS, XR KNEE 4 OR MORE VIEWS, XR WRIST 3 OR MORE VIEWS, XR FOREARM 2 VIEWS, XR ANKLE 3 OR MORE VIEWS, XR HAND 3 OR MORE VIEWS CLINICAL HISTORY: 42 years  Male;  injury FINDINGS: Left hand: 3 views. No acute fracture or dislocation. Left wrist: 4 views. No acute fracture or dislocation. Left forearm: 2 views. No acute fracture. Left ankle: Fracture of the medial malleolus. Possible fracture in the posterior malleolus also. Right knee: 4 views. Transverse fracture of the patella. Moderate knee effusion. Right wrist: 3 views. No acute fracture or dislocation.     Impression: 1.  Fracture of the left medial malleolus and possibly also the posterior malleolus. 2.  Fracture of the right patella with a moderate right knee effusion Electronically signed by:  Raghu Villagran MD  3/18/2023 2:41 AM CDT Workstation: [id number]    XR Ankle 3+ View Left    Result Date: 3/25/2023  Narrative: PROCEDURE: XR ANKLE 3+ VW LEFT VIEWS: 3 INDICATION: Pain COMPARISON: 3/18/2023 FINDINGS:   - fracture: Mild irregularity of the lateral malleolus consistent with nondisplaced fracture, as seen previously. - alignment: Normal displacement at fracture site   - misc: Small joint effusion. Mild soft tissue swelling lateral greater than medial     Impression: Transversely oriented, minimally displaced fracture of the lateral malleolus at the level of the joint line. Soft tissue swelling and small suspected effusion.. Electronically signed by:  Barbara Palmer MD  3/25/2023 3:39 PM CDT Workstation: [id number]    XR Ankle 3+ View Left    Result Date: 3/18/2023  Narrative: EXAM DESCRIPTION: XR WRIST 3+ VW LEFT (accession [id number]), XR KNEE 4+ VW RIGHT (accession [id number]), XR WRIST 3+ VW RIGHT (accession [id number]), XR FOREARM 2 VW LEFT (accession [id number]), XR ANKLE 3+ VW LEFT (accession [id number]), XR HAND 3+ VW LEFT (accession [id number]) RadLex: XR WRIST 3 OR MORE VIEWS, XR KNEE 4  OR MORE VIEWS, XR WRIST 3 OR MORE VIEWS, XR FOREARM 2 VIEWS, XR ANKLE 3 OR MORE VIEWS, XR HAND 3 OR MORE VIEWS CLINICAL HISTORY: 42 years  Male;  injury FINDINGS: Left hand: 3 views. No acute fracture or dislocation. Left wrist: 4 views. No acute fracture or dislocation. Left forearm: 2 views. No acute fracture. Left ankle: Fracture of the medial malleolus. Possible fracture in the posterior malleolus also. Right knee: 4 views. Transverse fracture of the patella. Moderate knee effusion. Right wrist: 3 views. No acute fracture or dislocation.     Impression: 1.  Fracture of the left medial malleolus and possibly also the posterior malleolus. 2.  Fracture of the right patella with a moderate right knee effusion Electronically signed by:  Raghu Villagran MD  3/18/2023 2:41 AM CDT Workstation: [id number]            ASSESSMENT:    Diagnoses and all orders for this visit:    Closed nondisplaced fracture of right patella with routine healing, unspecified fracture morphology, subsequent encounter  -     XR Knee 1 or 2 View Right    Nondisplaced fracture of lateral malleolus of left fibula, subsequent encounter for closed fracture with routine healing    Acute left ankle pain  -     HYDROcodone-acetaminophen (NORCO) 5-325 MG per tablet; Take 1 tablet by mouth 2 (Two) Times a Day As Needed for Moderate Pain.  -     Miscellaneous DME    Acute pain of right knee  -     HYDROcodone-acetaminophen (NORCO) 5-325 MG per tablet; Take 1 tablet by mouth 2 (Two) Times a Day As Needed for Moderate Pain.  -     Miscellaneous DME    Sprain of left wrist, initial encounter  -     HYDROcodone-acetaminophen (NORCO) 5-325 MG per tablet; Take 1 tablet by mouth 2 (Two) Times a Day As Needed for Moderate Pain.  -     Miscellaneous DME    Fall (on) (from) other stairs and steps, initial encounter          PLAN  Sent for x-ray upon arrival.  Reviewed the x-rays and discussed with the patient.  X-rays stable.  Advised patient I would notify of any  additional findings or reports suggested by radiology.  Reminded the patient to continue to be modified weightbearing and encouraged to bear greater weight on the left lower extremity than the right if needed.  But reminded to modify his WBA as much as possible.  Recommended to keep elevated and rested.  New prescription for hydrocodone sent to pharmacy.  Advised patient to take sparingly, recommended to alternate the ibuprofen and acetaminophen with the hydrocodone.  Educated the patient the prescription frequency was changed to twice daily as needed.  Recommended the patient to follow-up in 3 weeks for reevaluation.    Repeat right knee and left ankle next visit.    May follow-up sooner as needed if symptoms change or worsen or for new complaint.    Return in about 4 weeks (around 4/28/2023) for Repeat right knee and left ankle x-ray..    EMILEE Montenegro     This document has been electronically signed by EMILEE Montenegro on March 31, 2023 13:17 CDT      EM[facility] Dragon/Transciption Disclaimer: Some of this note may be an electronic transcription/translation of spoken language to printed text using the Dragon Dictation System.